# Patient Record
(demographics unavailable — no encounter records)

---

## 2025-03-25 NOTE — PLAN
[FreeTextEntry1] : 66 YO FEMALE PRESENTS FOR ANNUAL GYN EXAMINATION. DISCUSSED HORMONE REPLACEMENT THERAPY, RISKS, BENEFITS AND ALTERNATIVES. MAMMOGRAM ORDERED AND SELF BREAST EXAMINATION TAUGHT AND RECOMMENDED. BONE DENSITY STUDY INDICATIONS REVIEWED AND DISCUSSED. FOLLOW UP SCHEDULED.

## 2025-03-25 NOTE — PHYSICAL EXAM
[Chaperone Declined] : Patient declined chaperone [Appropriately responsive] : appropriately responsive [Alert] : alert [No Acute Distress] : no acute distress [Soft] : soft [Non-tender] : non-tender [Non-distended] : non-distended [No HSM] : No HSM [No Lesions] : no lesions [No Mass] : no mass [Oriented x3] : oriented x3 [Examination Of The Breasts] : a normal appearance [No Masses] : no breast masses were palpable [Labia Majora] : normal [Labia Minora] : normal [Uterine Adnexae] : normal [Normal rectal exam] : was normal [Normal Brown Stool] : was normal and brown [Internal Hemorrhoid] : no internal hemorrhoids were present [External Hemorrhoid] : no external hemorrhoids were present [Skin Tags] : no residual hemorrhoidal skin tags [Normal] : was normal [None] : there was no rectal mass